# Patient Record
Sex: MALE | Race: WHITE | NOT HISPANIC OR LATINO | URBAN - METROPOLITAN AREA
[De-identification: names, ages, dates, MRNs, and addresses within clinical notes are randomized per-mention and may not be internally consistent; named-entity substitution may affect disease eponyms.]

---
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
## 2023-12-27 ENCOUNTER — OFFICE VISIT (OUTPATIENT)
Dept: URGENT CARE | Facility: CLINIC | Age: 23
End: 2023-12-27
Payer: COMMERCIAL

## 2023-12-27 ENCOUNTER — OFFICE VISIT (OUTPATIENT)
Dept: URGENT CARE | Facility: CLINIC | Age: 23
End: 2023-12-27
Payer: OTHER MISCELLANEOUS

## 2023-12-27 VITALS
OXYGEN SATURATION: 99 % | WEIGHT: 138 LBS | HEIGHT: 68 IN | TEMPERATURE: 98 F | RESPIRATION RATE: 16 BRPM | DIASTOLIC BLOOD PRESSURE: 65 MMHG | HEART RATE: 120 BPM | BODY MASS INDEX: 20.92 KG/M2 | SYSTOLIC BLOOD PRESSURE: 118 MMHG

## 2023-12-27 DIAGNOSIS — S61.214A LACERATION OF RIGHT RING FINGER WITHOUT FOREIGN BODY WITHOUT DAMAGE TO NAIL, INITIAL ENCOUNTER: Primary | ICD-10-CM

## 2023-12-27 PROCEDURE — 90471 TD VACCINE GREATER THAN OR EQUAL TO 7YO WITH PRESERVATIVE IM: ICD-10-PCS | Mod: S$GLB,,, | Performed by: FAMILY MEDICINE

## 2023-12-27 PROCEDURE — 99203 PR OFFICE/OUTPT VISIT, NEW, LEVL III, 30-44 MIN: ICD-10-PCS | Mod: S$GLB,,, | Performed by: FAMILY MEDICINE

## 2023-12-27 PROCEDURE — 90714 TD VACC NO PRESV 7 YRS+ IM: CPT | Mod: S$GLB,,, | Performed by: FAMILY MEDICINE

## 2023-12-27 PROCEDURE — 99203 OFFICE O/P NEW LOW 30 MIN: CPT | Mod: S$GLB,,, | Performed by: FAMILY MEDICINE

## 2023-12-27 PROCEDURE — 90471 IMMUNIZATION ADMIN: CPT | Mod: S$GLB,,, | Performed by: FAMILY MEDICINE

## 2023-12-27 PROCEDURE — 90714 TD VACCINE GREATER THAN OR EQUAL TO 7YO WITH PRESERVATIVE IM: ICD-10-PCS | Mod: S$GLB,,, | Performed by: FAMILY MEDICINE

## 2023-12-27 RX ORDER — DEXTROAMPHETAMINE SACCHARATE, AMPHETAMINE ASPARTATE MONOHYDRATE, DEXTROAMPHETAMINE SULFATE AND AMPHETAMINE SULFATE 5; 5; 5; 5 MG/1; MG/1; MG/1; MG/1
CAPSULE, EXTENDED RELEASE ORAL EVERY MORNING
COMMUNITY
Start: 2023-10-26

## 2023-12-27 NOTE — PROGRESS NOTES
Subjective:      Patient ID: Enrique Petit is a 23 y.o. male.    Vitals:  vitals were not taken for this visit.     Chief Complaint: Laceration    Laceration     Skin:  Positive for laceration.    Objective:     Physical Exam    Assessment:     No diagnosis found.    Plan:       There are no diagnoses linked to this encounter.

## 2023-12-27 NOTE — LETTER
[facility] [place] [address], [address]  [facility] [address]  Phone: [phone]  Fax: [phone]  [facility] [facility]: [phone]    Pt Name: Enrique Petit  Injury Date: 12/26/2023   Employee ID:  Date of First Treatment: 12/27/2023   Company: [employer]      Appointment Time: 03:05 PM Arrived: 305   Provider: Mio Waldrop MD Time Out:400     Office Treatment:   1. Laceration of right ring finger without foreign body without damage to nail, initial encounter    Td updated.            Restrictions: Regular Duty (keep covered and dry.)     Return Appointment: as needed

## 2023-12-27 NOTE — PROGRESS NOTES
Subjective:      Patient ID: Enrique Petit is a 23 y.o. male.    Chief Complaint: Workers Comp    23 year old male presents today with a work related injury that occurred on 12/26/2023 around 4PM. Occupation is a [employer]. 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 R ring finger laceration. States he cleaned it with soap/water, peroxide, and neosporin. Last TDAP 02/2024, wants a booster today. Normal ROM. Pain scale 02/10. Describes the pain as a dull sensation. Localized pain. No prior injuries to finger before.     Laceration   Incident onset: 12/26/2023 around 4PM. Pain location: R ring finger. The laceration is 1 cm in size. The laceration mechanism was a nail. The pain is at a severity of 2/10. The pain is mild. He reports no foreign bodies present. His tetanus status is UTD.       Skin:  Positive for laceration.     Objective:     Physical Exam   Delayed presentation for 1.2cm lac to the palmar aspect of distal right 4th finger.   Assessment:      1. Laceration of right ring finger without foreign body without damage to nail, initial encounter      Plan:     1 steri strip placed.          Restrictions: Regular Duty (keep covered and dry.)  No follow-ups on file.